# Patient Record
Sex: MALE | Race: WHITE | NOT HISPANIC OR LATINO | URBAN - METROPOLITAN AREA
[De-identification: names, ages, dates, MRNs, and addresses within clinical notes are randomized per-mention and may not be internally consistent; named-entity substitution may affect disease eponyms.]

---

## 2018-10-09 ENCOUNTER — EMERGENCY (EMERGENCY)
Facility: HOSPITAL | Age: 23
LOS: 0 days | Discharge: HOME | End: 2018-10-09
Attending: EMERGENCY MEDICINE
Payer: SELF-PAY

## 2018-10-09 VITALS
RESPIRATION RATE: 18 BRPM | DIASTOLIC BLOOD PRESSURE: 81 MMHG | OXYGEN SATURATION: 98 % | TEMPERATURE: 98 F | SYSTOLIC BLOOD PRESSURE: 124 MMHG | WEIGHT: 214.95 LBS | HEART RATE: 74 BPM | HEIGHT: 69 IN

## 2018-10-09 DIAGNOSIS — R21 RASH AND OTHER NONSPECIFIC SKIN ERUPTION: ICD-10-CM

## 2018-10-09 DIAGNOSIS — B02.8 ZOSTER WITH OTHER COMPLICATIONS: ICD-10-CM

## 2018-10-09 PROCEDURE — 99283 EMERGENCY DEPT VISIT LOW MDM: CPT

## 2018-10-09 RX ORDER — IBUPROFEN 200 MG
1 TABLET ORAL
Qty: 20 | Refills: 0 | OUTPATIENT
Start: 2018-10-09

## 2018-10-09 RX ORDER — VALACYCLOVIR 500 MG/1
1 TABLET, FILM COATED ORAL
Qty: 21 | Refills: 0 | OUTPATIENT
Start: 2018-10-09 | End: 2018-10-15

## 2018-10-09 RX ORDER — VALACYCLOVIR 500 MG/1
1000 TABLET, FILM COATED ORAL ONCE
Qty: 0 | Refills: 0 | Status: COMPLETED | OUTPATIENT
Start: 2018-10-09 | End: 2018-10-09

## 2018-10-09 RX ORDER — DIPHENHYDRAMINE HCL 50 MG
1 CAPSULE ORAL
Qty: 20 | Refills: 0 | OUTPATIENT
Start: 2018-10-09

## 2018-10-09 RX ADMIN — VALACYCLOVIR 1000 MILLIGRAM(S): 500 TABLET, FILM COATED ORAL at 16:57

## 2018-10-09 NOTE — ED ADULT NURSE NOTE - NSIMPLEMENTINTERV_GEN_ALL_ED
Implemented All Universal Safety Interventions:  [place] to call system. Call bell, personal items and telephone within reach. Instruct patient to call for assistance. Room bathroom lighting operational. Non-slip footwear when patient is off stretcher. Physically safe environment: no spills, clutter or unnecessary equipment. Stretcher in lowest position, wheels locked, appropriate side rails in place.

## 2018-10-09 NOTE — ED PROVIDER NOTE - MEDICAL DECISION MAKING DETAILS
27yo male with vesicular rash in dermatomal distribution; recently under stress and URI.  VS wnl; exam otherwise remarkable.  A/P: Zoster; tx w. oral valtrex; benadryl, and NSAIDS for pain.  Pt instructed to f/u with PMD; void persons without vaccination, pregnancy, on chemotherapy

## 2018-10-09 NOTE — ED ADULT NURSE NOTE - OBJECTIVE STATEMENT
Patient complains of rash that is occasionally itchy and is painful. Patient has rash along dermatome. Pt denies fever but had recent stress and cold. Pt doesn't remember if vaccinated or if he had chicken pox when younger.

## 2018-10-09 NOTE — ED PROVIDER NOTE - OBJECTIVE STATEMENT
29yo male with recent URI and increased stress due to family member illness noted itchy/tingly rash that extends from the back to the anterior chest.

## 2021-05-25 NOTE — ED PROVIDER NOTE - CPE EDP GASTRO NORM

## 2022-10-06 NOTE — ED ADULT NURSE NOTE - FINAL NURSING ELECTRONIC SIGNATURE
Writer left a message for the patient to reschedule his 11/15 follow up with Dr. Shaw from 11:30 to 9:30, as Dr. Shaw will be in a procedure at that time. Please assist in rescheduling if the patient calls back.   09-Oct-2018 17:20